# Patient Record
Sex: FEMALE | Race: WHITE
[De-identification: names, ages, dates, MRNs, and addresses within clinical notes are randomized per-mention and may not be internally consistent; named-entity substitution may affect disease eponyms.]

---

## 2018-02-24 ENCOUNTER — HOSPITAL ENCOUNTER (EMERGENCY)
Dept: HOSPITAL 17 - PHED | Age: 83
Discharge: HOME | End: 2018-02-24
Payer: MEDICARE

## 2018-02-24 VITALS
RESPIRATION RATE: 18 BRPM | TEMPERATURE: 97.9 F | HEART RATE: 73 BPM | DIASTOLIC BLOOD PRESSURE: 91 MMHG | SYSTOLIC BLOOD PRESSURE: 166 MMHG | OXYGEN SATURATION: 95 %

## 2018-02-24 VITALS
OXYGEN SATURATION: 96 % | RESPIRATION RATE: 20 BRPM | SYSTOLIC BLOOD PRESSURE: 158 MMHG | DIASTOLIC BLOOD PRESSURE: 85 MMHG | HEART RATE: 72 BPM

## 2018-02-24 VITALS — WEIGHT: 176.37 LBS | BODY MASS INDEX: 27.68 KG/M2 | HEIGHT: 67 IN

## 2018-02-24 DIAGNOSIS — I10: ICD-10-CM

## 2018-02-24 DIAGNOSIS — Z88.8: ICD-10-CM

## 2018-02-24 DIAGNOSIS — E78.00: ICD-10-CM

## 2018-02-24 DIAGNOSIS — I44.0: ICD-10-CM

## 2018-02-24 DIAGNOSIS — S63.502A: Primary | ICD-10-CM

## 2018-02-24 DIAGNOSIS — F03.90: ICD-10-CM

## 2018-02-24 DIAGNOSIS — Z79.899: ICD-10-CM

## 2018-02-24 DIAGNOSIS — Z79.82: ICD-10-CM

## 2018-02-24 PROCEDURE — 99284 EMERGENCY DEPT VISIT MOD MDM: CPT

## 2018-02-24 PROCEDURE — 93005 ELECTROCARDIOGRAM TRACING: CPT

## 2018-02-24 PROCEDURE — 73100 X-RAY EXAM OF WRIST: CPT

## 2018-02-24 PROCEDURE — L3908 WHO COCK-UP NONMOLDE PRE OTS: HCPCS

## 2018-02-24 PROCEDURE — 73030 X-RAY EXAM OF SHOULDER: CPT

## 2018-02-24 NOTE — RADRPT
EXAM DATE/TIME:  02/24/2018 10:29 

 

[facility] COMPARISON:     

No previous studies available for comparison.

 

                     

INDICATIONS :     

Left shoulder pain.  No known trauma.

                     

 

MEDICAL HISTORY :     

None.          

 

SURGICAL HISTORY :        

 

ENCOUNTER:     

Initial                                        

 

ACUITY:     

1 week      

 

PAIN SCORE:     

5/10

 

LOCATION:     

Left  shoulder

 

FINDINGS:     

Two view examination of the left shoulder demonstrates no evidence of fracture or dislocation.  The g
lenohumeral and acromioclavicular joints are maintained. The bones appear osteopenic.

 

CONCLUSION:     No acute disease.  

 

 

 

 Presley Coleman MD on February 24, 2018 at 11:01           

Board Certified Radiologist.

 This report was verified electronically.

## 2018-02-24 NOTE — PD
HPI


Chief Complaint:  Musculoskeletal Complaint


Time Seen by Provider:  10:29


Travel History


International Travel<30 days:  No


Contact w/Intl Traveler<30days:  No


Traveled to known affect area:  No





History of Present Illness


HPI


Patient presents for evaluation of left wrist pain.  Mildly demented and very 

pleasant.  Friends report that they went to her house this morning and when she 

answered the door she is complaining of left wrist pain.  Patient does not 

recall a fall misstep or trauma to the wrist.  She was observed yesterday 

evening without pain.





PFSH


Past Medical History


Cardiovascular Problems:  No


High Cholesterol:  Yes


Dementia:  Yes (EARLY PER PARAMEDICS)


Diabetes:  No


Diminished Hearing:  No


Hepatitis:  No


Hiatal Hernia:  No


Hypertension:  Yes


Respiratory:  No


Thyroid Disease:  Yes


Influenza Vaccination:  Yes


Pregnant?:  Not Pregnant





Past Surgical History


Abdominal Surgery:  Yes (APPY)


Appendectomy:  Yes


Gynecologic Surgery:  Yes (HYSTERECTOMY)


Hysterectomy:  Yes


Oral Surgery:  Yes (T&A CHILDHOOD)


Pacemaker:  No


Tonsillectomy:  Yes


Other Surgery:  Yes





Social History


Alcohol Use:  Yes (OCC)


Tobacco Use:  No


Substance Use:  No





Allergies-Medications


(Allergen,Severity, Reaction):  


Coded Allergies:  


     propoxyphene (Unverified  Adverse Reaction, Intermediate, HALLUCINATIONS, 2 /24/18)


Reported Meds & Prescriptions





Reported Meds & Active Scripts


Active


Reported


Aspirin Low Dose (Aspirin) 81 Mg Chew 81 Mg CHEW DAILY


Amlodipine (Amlodipine Besylate) 5 Mg Tab 5 Mg PO DAILY


Levothyroxine (Levothyroxine Sodium) 88 Mcg Tab 88 Mcg PO DAILY








Review of Systems


General / Constitutional:  No: Fever


Eyes:  No: Visual changes


HENT:  No: Headaches


Cardiovascular:  No: Chest Pain or Discomfort


Respiratory:  No: Shortness of Breath


Gastrointestinal:  No: Abdominal Pain


Genitourinary:  No: Dysuria


Musculoskeletal:  No: Pain


Skin:  No Rash


Neurologic:  No: Weakness


Psychiatric:  No: Depression


Endocrine:  No: Polydipsia


Hematologic/Lymphatic:  No: Easy Bruising





Physical Exam


Narrative


GENERAL: Well-nourished, well-developed patient.


SKIN: Focused skin assessment warm/dry.


HEAD: Normocephalic.


EYES: No scleral icterus. No injection or drainage. 


NECK: Supple, trachea midline. No JVD or lymphadenopathy.


CARDIOVASCULAR: Regular rate and rhythm without murmurs, gallops, or rubs. 


RESPIRATORY: Breath sounds equal bilaterally. No accessory muscle use.


GASTROINTESTINAL: Abdomen soft, non-tender, nondistended. 


MUSCULOSKELETAL: No cyanosis, or edema. 


BACK: Nontender without obvious deformity. No CVA tenderness. 


Examination left wrist reveals discomfort with flexion and extension, no bony 

deformity, no edema, decreased  strength





Data


Data


Last Documented VS





Vital Signs








  Date Time  Temp Pulse Resp B/P (MAP) Pulse Ox O2 Delivery O2 Flow Rate FiO2


 


2/24/18 11:19  72 20 158/85 (109) 96 Room Air  


 


2/24/18 09:40 97.9       








Orders





 Orders


Wrist, Limited (Ap&Lat) (2/24/18 )


Shoulder, Limited(2vws) (2/24/18 )


Electrocardiogram (2/24/18 )








MDM


Medical Decision Making


Medical Screen Exam Complete:  Yes


Emergency Medical Condition:  Yes


Differential Diagnosis


Fall risk, wrist sprain, wrist fracture, radial tenosynovitis


Narrative Course


Assessment and plan discussed with patient at bedside.  EKG revealed sinus 

rhythm with first-degree block rate of 71.


Last 72 hours Impressions








Wrist X-Ray 2/24/18 0000 Signed





Impressions: 





 Service Date/Time:  Saturday, February 24, 2018 10:27 - CONCLUSION:  Chronic 





 change as described above. An acute abnormality is not seen.     Presley Coleman MD 


 


Shoulder X-Ray 2/24/18 0000 Signed





Impressions: 





 Service Date/Time:  Saturday, February 24, 2018 10:29 - CONCLUSION: No acute 





 disease.       Presley Coleman MD 











Diagnosis





 Primary Impression:  


 Left wrist sprain


 Qualified Codes:  S63.502A - Unspecified sprain of left wrist, initial 

encounter


Patient Instructions:  General Instructions





***Additional Instructions:  


Velcro wrist splint for comfort, Motrin or Tylenol for pain, ice and elevate.  

Follow-up with PCP.  Return to the emergency with any onset of new symptoms.


***Med/Other Pt SpecificInfo:  No Meds Exist/No RX given


Disposition:  01 DISCHARGE HOME


Condition:  Good











Robbie Marcelo MD Feb 24, 2018 10:29

## 2018-02-24 NOTE — EKG
Date Performed: 02/24/2018       Time Performed: 10:00:59

 

PTAGE:      82 years

 

EKG:      Sinus rhythm 

 

 WITH FIRST DEGREE AV BLOCK LOW QRS VOLTAGE IN PRECORDIAL LEADS ABNORMAL ECG

 

PREVIOUS TRACING       : 04/06/2015 18.34 Compared to previous tracing, nonspecific ST abnormality ha
s resolved.

 

DOCTOR:   Brodie Lock  Interpretating Date/Time  02/24/2018 12:42:06

## 2018-02-24 NOTE — RADRPT
EXAM DATE/TIME:  02/24/2018 10:27 

 

[facility] COMPARISON:     

No previous studies available for comparison.

 

                     

INDICATIONS :     

Complains of left wrist pain.  No known trauma.

                     

 

MEDICAL HISTORY :     

None.          

 

SURGICAL HISTORY :     

None.   

 

ENCOUNTER:     

Initial                                        

 

ACUITY:     

1 week      

 

PAIN SCORE:     

5/10

 

LOCATION:     

Left  wrist

 

FINDINGS:     

No fracture is seen. There is joint space narrowing and subchondral sclerosis at the lateral mid carp
al region between the distal scaphoid and the base of the trapezoid bone. The radiocarpal joint is no
rmally aligned. The bones appear osteopenic.

 

CONCLUSION:     

Chronic change as described above. An acute abnormality is not seen.

 

 

 

 Presley Coleman MD on February 24, 2018 at 11:00           

Board Certified Radiologist.

 This report was verified electronically.